# Patient Record
Sex: MALE | URBAN - METROPOLITAN AREA
[De-identification: names, ages, dates, MRNs, and addresses within clinical notes are randomized per-mention and may not be internally consistent; named-entity substitution may affect disease eponyms.]

---

## 2021-01-12 ENCOUNTER — TELEPHONE (OUTPATIENT)
Dept: OTHER | Facility: CLINIC | Age: 31
End: 2021-01-12

## 2021-01-12 NOTE — TELEPHONE ENCOUNTER
Patient is calling as a self referral. He states that he used to see Dr. Navarro in a different health system and looked him and called him today, he states that he was told to call here to schedule with Dr. Nassar or Dr. Rai.    Patient states that he would like to see someone regarding a stent in his left subclavian vein. He has been seeing Dr. Moraes with [facility] for follow up with this stent. He states that he has been feeling swelling, tightness and pressure in his left arm and is seeking another opinion.    Patient can be reached at [phone]

## 2021-01-12 NOTE — TELEPHONE ENCOUNTER
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 Jose Moraes at [facility].  Left subclavian stent placed 7/24/15.    8/24/15 s/p right first rib resection with Dr. Moraes.    10/18/18 s/p balloon angioplasty of left subclavian vein stent.     8/11/20 s/p balloon angioplasty of left subclavian stent.    Requested the following images from [facility] to be pushed to PACS:    Chest xray (2015)    Venogram (8/2020)    Left UE venous US (1/11/21, 9/9/20, 8/3/20)    Patient began experiencing left arm swelling and tightness with some discoloration on 1/9/21.  Patient had left UE venous US completed on 1/11/21 at [facility] (report in Care Everywhere); no evidence of DVT in left UE.    Will discuss with Dr. Nassar if patient needs any additional testing prior to consult.  Spoke with patient and he is aware of this and will hear back by end of week.    Kat Hubbard, BSN, RN-[facility] [place]

## 2021-01-12 NOTE — TELEPHONE ENCOUNTER
Discussed with Dr. Nassar.  No further imaging needed.    Routing to scheduling to contact patient to coordinate in person consult at next available.    Appt note:  Self referral for second opinion; s/p bilateral rib resections, left subclavian stent; notes in Care Everywhere, multiple images pushed to PACS.    EVELYN RiosN, RN-[facility] [place]

## 2021-01-13 NOTE — TELEPHONE ENCOUNTER
Patient called back. He was referred to [facility] and will be scheduling there. He will call back if he decides to see Dr. Nassar